# Patient Record
Sex: FEMALE
[De-identification: names, ages, dates, MRNs, and addresses within clinical notes are randomized per-mention and may not be internally consistent; named-entity substitution may affect disease eponyms.]

---

## 2019-01-11 NOTE — PRE-PROCEDURE NOTE/ATTESTATION
Pre-Procedure Note/Attestation


Complete Prior to Procedure


Planned Procedure:  left


Procedure Narrative:


Cataract Extraction With Inner Ocular Lens Implant Left Eye





Indications for Procedure


Pre-Operative Diagnosis:


Cataract left Eye





Attestation


I attest that I discussed the nature of the procedure; its benefits; risks and 

complications; and alternatives (and the risks and benefits of such alternatives

), prior to the procedure, with the patient (or the patient's legal 

representative).





I attest that, if there was a reasonable possibility of needing a blood 

transfusion, the patient (or the patient's legal representative) was given the 

[facility] standardized written summary, pursuant 

to the [place] Blood Safety Act ([place] Health and Safety Code # 1645, as 

amended).





I attest that I re-evaluated the patient just prior to the surgery and that 

there has been no change in the patient's H&P, except as documented below:











Davion Argueta MD Jan 11, 2019 15:06

## 2019-01-11 NOTE — BRIEF OPERATIVE NOTE
Immediate Post Operative Note


Operative Note


Chief Complaint:  blurry vision


Pre-op Diagnosis:


Cataract left Eye


Procedure:


Cataract Extraction With Inner Ocular Lens Implant Left Eye


Post-op Diagnosis:  same as pre-op


Surgeon:  Davion Argueta MD


Anesthesia:  MAC


Specimen:  none


Complications:  none


Fluids:  LR


Estimated Blood Loss:  none


Drains:  none


Implant(s) used?:  Yes











Davion Argueta MD Jan 11, 2019 15:04

## 2019-01-11 NOTE — OPERATIVE NOTE - PDOC
Operative Note


Operative Note


Date of Operation/Procedure:  Jan 14, 2019


Chief Complaint:  blurry vision


Pre-op Diagnosis:


Cataract left Eye


Procedure:


Cataract Extraction With Inner Ocular Lens Implant Left Eye


Post-op Diagnosis:  same as pre-op


Surgeon:  Davion Argueta MD


Anesthesia:  MAC


Specimen:  none


Complications:  none


Fluids:  LR


Estimated Blood Loss:  none


Drains:  none


Implant(s) used?:  Yes


Indications for Procedure


Nuclear Sclerotic Cataract Left Eye


Description of Procedure


Cataract Extraction With Inner Ocular Lens Implant Left Eye











Davion Argueta MD Jan 11, 2019 15:06

## 2019-01-14 ENCOUNTER — HOSPITAL ENCOUNTER (OUTPATIENT)
Dept: HOSPITAL 72 - SUR | Age: 69
Discharge: HOME | End: 2019-01-14
Payer: MEDICARE

## 2019-01-14 VITALS — DIASTOLIC BLOOD PRESSURE: 88 MMHG | SYSTOLIC BLOOD PRESSURE: 159 MMHG

## 2019-01-14 VITALS — SYSTOLIC BLOOD PRESSURE: 133 MMHG | DIASTOLIC BLOOD PRESSURE: 70 MMHG

## 2019-01-14 VITALS — DIASTOLIC BLOOD PRESSURE: 73 MMHG | SYSTOLIC BLOOD PRESSURE: 146 MMHG

## 2019-01-14 VITALS — DIASTOLIC BLOOD PRESSURE: 44 MMHG | SYSTOLIC BLOOD PRESSURE: 103 MMHG

## 2019-01-14 VITALS — SYSTOLIC BLOOD PRESSURE: 135 MMHG | DIASTOLIC BLOOD PRESSURE: 72 MMHG

## 2019-01-14 VITALS — HEIGHT: 65 IN | BODY MASS INDEX: 32.49 KG/M2 | WEIGHT: 195 LBS

## 2019-01-14 VITALS — DIASTOLIC BLOOD PRESSURE: 74 MMHG | SYSTOLIC BLOOD PRESSURE: 149 MMHG

## 2019-01-14 VITALS — DIASTOLIC BLOOD PRESSURE: 68 MMHG | SYSTOLIC BLOOD PRESSURE: 136 MMHG

## 2019-01-14 VITALS — SYSTOLIC BLOOD PRESSURE: 138 MMHG | DIASTOLIC BLOOD PRESSURE: 72 MMHG

## 2019-01-14 VITALS — SYSTOLIC BLOOD PRESSURE: 146 MMHG | DIASTOLIC BLOOD PRESSURE: 71 MMHG

## 2019-01-14 DIAGNOSIS — H25.12: Primary | ICD-10-CM

## 2019-01-14 DIAGNOSIS — E11.9: ICD-10-CM

## 2019-01-14 DIAGNOSIS — E78.5: ICD-10-CM

## 2019-01-14 DIAGNOSIS — Z90.710: ICD-10-CM

## 2019-01-14 DIAGNOSIS — E66.9: ICD-10-CM

## 2019-01-14 DIAGNOSIS — Z79.82: ICD-10-CM

## 2019-01-14 DIAGNOSIS — I10: ICD-10-CM

## 2019-01-14 DIAGNOSIS — Z79.4: ICD-10-CM

## 2019-01-14 PROCEDURE — 82962 GLUCOSE BLOOD TEST: CPT

## 2019-01-14 PROCEDURE — 66984 XCAPSL CTRC RMVL W/O ECP: CPT

## 2019-01-14 PROCEDURE — 94003 VENT MGMT INPAT SUBQ DAY: CPT

## 2019-01-14 PROCEDURE — 94150 VITAL CAPACITY TEST: CPT

## 2019-01-14 RX ADMIN — PHENYLEPHRINE HYDROCHLORIDE SCH DROP: 100 SOLUTION/ DROPS OPHTHALMIC at 08:51

## 2019-01-14 RX ADMIN — TOBRAMYCIN SCH DROP: 3 SOLUTION OPHTHALMIC at 09:24

## 2019-01-14 RX ADMIN — Medication SCH DROP: at 09:24

## 2019-01-14 RX ADMIN — PHENYLEPHRINE HYDROCHLORIDE SCH DROP: 100 SOLUTION/ DROPS OPHTHALMIC at 09:24

## 2019-01-14 RX ADMIN — Medication SCH DROP: at 08:51

## 2019-01-14 RX ADMIN — CYCLOPENTOLATE HYDROCHLORIDE SCH DROP: 10 SOLUTION OPHTHALMIC at 08:51

## 2019-01-14 RX ADMIN — TOBRAMYCIN SCH DROP: 3 SOLUTION OPHTHALMIC at 09:10

## 2019-01-14 RX ADMIN — DICLOFENAC SODIUM SCH DROP: 1 SOLUTION/ DROPS OPHTHALMIC at 08:51

## 2019-01-14 RX ADMIN — PHENYLEPHRINE HYDROCHLORIDE SCH DROP: 100 SOLUTION/ DROPS OPHTHALMIC at 09:10

## 2019-01-14 RX ADMIN — DICLOFENAC SODIUM SCH DROP: 1 SOLUTION/ DROPS OPHTHALMIC at 09:24

## 2019-01-14 RX ADMIN — CYCLOPENTOLATE HYDROCHLORIDE SCH DROP: 10 SOLUTION OPHTHALMIC at 09:24

## 2019-01-14 RX ADMIN — DICLOFENAC SODIUM SCH DROP: 1 SOLUTION/ DROPS OPHTHALMIC at 09:10

## 2019-01-14 RX ADMIN — Medication SCH DROP: at 09:10

## 2019-01-14 RX ADMIN — TOBRAMYCIN SCH DROP: 3 SOLUTION OPHTHALMIC at 08:53

## 2019-01-14 RX ADMIN — CYCLOPENTOLATE HYDROCHLORIDE SCH DROP: 10 SOLUTION OPHTHALMIC at 09:10

## 2019-01-14 NOTE — IMMEDIATE POST-OP EVALUATION
Immediate Post-Op Evalulation


Immediate Post-Op Evalulation


Procedure:  Cataract extraction with IOL left eye


Date of Evaluation:  Jan 14, 2019


Time of Evaluation:  12:32


IV Fluids:  500


Blood Pressure Systolic:  149


Blood Pressure Diastolic:  74


Pulse Rate:  82


Respiratory Rate:  17


O2 Sat by Pulse Oximetry:  98


Temperature (Fahrenheit):  98.5


Pain Score (1-10):  0


Nausea:  No


Vomiting:  No


Complications


No complication


Patient Status:  awake, patent, none


Hydration Status:  adequate


Drug:  None











Parrish Goel MD Jan 14, 2019 11:30

## 2019-01-14 NOTE — 48 HOUR POST ANESTHESIA EVAL
Post Anesthesia Evaluation


Procedure:  Cataract extraction with IOL left eye


Date of Evaluation:  Jan 14, 2019


Time of Evaluation:  12:50


Blood Pressure Systolic:  152


0:  74


Pulse Rate:  80


Respiratory Rate:  18


O2 Sat by Pulse Oximetry:  98


Airway:  patent


Nausea:  No


Vomiting:  No


Pain Intensity:  0


Hydration Status:  adequate


Cardiopulmonary Status:


Stable


Mental Status/LOC:  patient returned to baseline


Follow-up Care/Observations:


As per surgery


Post-Anesthesia Complications:


No anesthetic complication


Follow-up care needed:  N/A











Parrish Goel MD Jan 14, 2019 12:24

## 2019-01-14 NOTE — ANETHESIA PREOPERATIVE EVAL
Anesthesia Pre-op PMH/ROS


General


Date of Evaluation:  Jan 14, 2019


Time of Evaluation:  11:01


Anesthesiologist:  Manasa


ASA Score:  ASA 3


Mallampati Score


Class I : Soft palate, uvula, fauces, pillars visible


Class II: Soft palate, uvula, fauces visible


Class III: Soft palate, base of uvula visible


Class IV: Only hard plate visible


Mallampati Classification:  Class III


Surgeon:  Ellie


Diagnosis:  Cataract left eye


Surgical Procedure:  Cataract extraction with IOL left eye


Allergies:  


Coded Allergies:  


     No Known Allergies (Unverified , 1/11/19)


Medications:  see eMAR


Patient NPO?:  Yes


NPO Date:  Jan 14, 2019


NPO Time:  21:00





Past Medical History


Cardiovascular:  Reports: HTN; 


   Denies: CAD, MI, valve dz, arrhythmia, other


Pulmonary:  Denies: asthma, COPD, FANG, other


Gastrointestinal/Genitourinary:  Denies: GERD, CRI, ESRD, other


Neurologic/Psychiatric:  Denies: dementia, CVA, depression/anxiety, TIA, other


Endocrine:  Reports: DM; 


   Denies: hypothyroidism, steroids, other


HEENT:  Reports: cataract (L), cataract (R); 


   Denies: glaucoma, Teller (L), Teller (R), other


Hematology/Immune:  Denies: anemia, DVT, bleeding disorder, other


Musculoskeletal/Integumentary:  Denies: OA, RA, DJD, DDD, edema, other


Other:  obesity


PMH Narrative:


DM, HTN, hypercholesterolemia, obesity, recent couph


PSxH Narrative:


LORRAINE





Anesthesia Pre-op Phys. Exam


Physician Exam





Last Vital Signs








  Date Time  Temp Pulse Resp B/P (MAP) Pulse Ox O2 Delivery O2 Flow Rate FiO2


 


1/14/19 09:12      Room Air  


 


1/14/19 09:01 97.4 73 18 159/88 98   








Constitutional:  NAD


Neurologic:  CN 2-12 intact


Cardiovascular:  RRR, no M/R/G


Respiratory:  CTA - Recent cough, finished antibiotics yesterday, will give 

guafenacin PO prior to surgery


Gastrointestinal:  S/NT/ND





Airway Exam


Mallampati Score:  Class III


MO:  full


ROM:  full


Teeth:  intact





Anesthesia Pre-op A/P


Labs


No contraindication for surgery





Risk Assessment & Plan


Assessment:


Class 3 patient for cataract extraction


Plan:


MAC


Status Change Before Surgery:  No





Pre-Antibiotics


Drug:  None











Parrish Goel MD Jan 14, 2019 11:13

## 2019-01-15 NOTE — OPERATIVE NOTE - PDOC
Operative Note


Operative Note


Date of Operation/Procedure:  Jan 14, 2019


Chief Complaint:  blurry vision


Pre-op Diagnosis:


Dense Cataract left Eye


Procedure:


Cataract Extraction With Intra Ocular Lens Implant Left Eye


Post-op Diagnosis:


Pseudophakia


Post-op Diagnosis:  same as pre-op


Surgeon:  Ellie


Anesthesiologist:  Manasa


Anesthesia:  MAC


Specimen:  none


Complications:  none


Condition:  stable


Fluids:  Lr


Estimated Blood Loss:  none


Drains:  none


Implant(s) used?:  Yes


Indications for Procedure


cataract


Description of Procedure


This patient has been complaining visually significant cataract in the affected 

eye with the best corrected visual acuity under moderate glare conditions 

worse. The patient complains of difficulties with glare in performing 

activities of daily living and wants to manage personal affairs with comfort 

and accuracy and see well enough to move with safety at home and outdoors.


    


The risks, benefits and alternatives of the procedure were discussed with the 

patient in the office prior to scheduling surgery. All questions from the 

patient were answered after the surgical procedure was explained in detail. The 

risks of the procedure as explained to the patient include, but are not limited 

to, pain, infection, bleeding, loss of vision, retinal detachment, need for 

further surgery, loss of lens nucleus, double vision, etc. Alternative 

procedures were discussed which include, to do nothing or seek a second 

opinion. Informed consent for this procedure was obtained from the patient. The 

patient was referred to a primary care physician for a cardiopulmonary 

clearance prior to surgery, after proper evaluation was done patient was 

properly scheduled for outpatient surgery.


The patient was brought to the operating room where the anesthesiologist 

established I.V. lines and cardiac monitoring leads. Mild intravenous sedation 

was administered.   The patient was then prepared with a 5% solution of povidone

-iodine to the conjunctival fornix and lashes, and a  5% solution of povidone-

iodine to the lids and periorbital skin. The patient was then draped in the 

usual sterile fashion. A lid speculum was then placed in the operative eye. A 

keratome blade was then used to create a biplanar incision into the anterior 

chamber. Viscoelastics was then instilled into the anterior chamber.


A  curvilinear capsulorrhexis was then fashioned with an utrata forceps 

followed by  hydrodissection  and hydro delineation of  the dense lens nucleus. 

Paracentesis incision was made at 3 o'clock with sharp blade. The 

phacoemulsification unit, after being properly adjusted  and tested, was then 

used to emulsify the dense nucleus followed by aspiration and irrigation of 

residual cortical material. Healon was then instilled into the anterior 

chamber. The corneal wound was then enlarged to the size of the optic with the 

lacey keratome blade. The intraocular lens was then inspected for right  

power and size  and thought to be satisfactory. Then the lens was gently placed 

in the capsular bag. Positioning within the capsular bag was confirmed by 

direct visualization. Optic centration was accomplished with a Sinskey hook.


Viscoelastics  was removed from the anterior chamber using the irrigation and 

aspiration unit. The corneal wound was then tested for leaks and none were 

found. The lid speculum were then removed. Sponge and needle counts were 

correct. An eye patch and shield were placed over the operative eye. The 

patient was taken to the recovery room in stable condition. There were no 

complications. The patient tolerated the procedure well. The patient was then 

transferred to the ambulatory surgery unit in stable and satisfactory condition

, was given detailed written instructions and asked to follow up  in the office 

the next day.











Davion Argueta MD Remigio 15, 2019 15:09

## 2019-01-15 NOTE — BRIEF OPERATIVE NOTE
Immediate Post Operative Note


Operative Note


Chief Complaint:  blurry vision


Pre-op Diagnosis:


Dense Cataract left Eye


Procedure:


Cataract Extraction With Intra Ocular Lens Implant Left Eye


Post-op Diagnosis:


Pseudophakia


Post-op Diagnosis:  same as pre-op


Surgeon:  Ellie


Anesthesiologist:  Manasa


Anesthesia:  MAC


Specimen:  none


Complications:  none


Condition:  stable


Fluids:  Lr


Estimated Blood Loss:  none


Drains:  none


Implant(s) used?:  Yes











Davion Argueta MD Remigio 15, 2019 15:07